# Patient Record
Sex: FEMALE | Race: WHITE | URBAN - METROPOLITAN AREA
[De-identification: names, ages, dates, MRNs, and addresses within clinical notes are randomized per-mention and may not be internally consistent; named-entity substitution may affect disease eponyms.]

---

## 2009-04-24 LAB — HIV AG/AB: NON REACTIVE

## 2019-12-09 ENCOUNTER — OFFICE VISIT (OUTPATIENT)
Dept: FAMILY MEDICINE CLINIC | Age: 49
End: 2019-12-09
Payer: COMMERCIAL

## 2019-12-09 VITALS
RESPIRATION RATE: 18 BRPM | BODY MASS INDEX: 25.78 KG/M2 | DIASTOLIC BLOOD PRESSURE: 84 MMHG | OXYGEN SATURATION: 98 % | SYSTOLIC BLOOD PRESSURE: 126 MMHG | HEART RATE: 84 BPM | HEIGHT: 64 IN | WEIGHT: 151 LBS

## 2019-12-09 DIAGNOSIS — Z00.00 ANNUAL PHYSICAL EXAM: ICD-10-CM

## 2019-12-09 DIAGNOSIS — Z00.00 ANNUAL PHYSICAL EXAM: Primary | ICD-10-CM

## 2019-12-09 DIAGNOSIS — L73.2 HIDRADENITIS SUPPURATIVA: ICD-10-CM

## 2019-12-09 DIAGNOSIS — R13.14 PHARYNGOESOPHAGEAL DYSPHAGIA: ICD-10-CM

## 2019-12-09 DIAGNOSIS — D23.5 DILATED PORE OF WINER OF BACK: ICD-10-CM

## 2019-12-09 DIAGNOSIS — Z87.42 HISTORY OF ABNORMAL CERVICAL PAP SMEAR: Primary | ICD-10-CM

## 2019-12-09 LAB
A/G RATIO: 1.6 (ref 1.1–2.2)
ALBUMIN SERPL-MCNC: 4.6 G/DL (ref 3.4–5)
ALP BLD-CCNC: 77 U/L (ref 40–129)
ALT SERPL-CCNC: 12 U/L (ref 10–40)
ANION GAP SERPL CALCULATED.3IONS-SCNC: 16 MMOL/L (ref 3–16)
AST SERPL-CCNC: 13 U/L (ref 15–37)
BASOPHILS ABSOLUTE: 0 K/UL (ref 0–0.2)
BASOPHILS RELATIVE PERCENT: 0.4 %
BILIRUB SERPL-MCNC: 0.3 MG/DL (ref 0–1)
BUN BLDV-MCNC: 12 MG/DL (ref 7–20)
CALCIUM SERPL-MCNC: 9.4 MG/DL (ref 8.3–10.6)
CHLORIDE BLD-SCNC: 99 MMOL/L (ref 99–110)
CHOLESTEROL, TOTAL: 188 MG/DL (ref 0–199)
CO2: 24 MMOL/L (ref 21–32)
CREAT SERPL-MCNC: 0.6 MG/DL (ref 0.6–1.1)
EOSINOPHILS ABSOLUTE: 0.1 K/UL (ref 0–0.6)
EOSINOPHILS RELATIVE PERCENT: 1.7 %
GFR AFRICAN AMERICAN: >60
GFR NON-AFRICAN AMERICAN: >60
GLOBULIN: 2.9 G/DL
GLUCOSE BLD-MCNC: 94 MG/DL (ref 70–99)
HCT VFR BLD CALC: 38.7 % (ref 36–48)
HDLC SERPL-MCNC: 46 MG/DL (ref 40–60)
HEMOGLOBIN: 13.1 G/DL (ref 12–16)
LDL CHOLESTEROL CALCULATED: 94 MG/DL
LYMPHOCYTES ABSOLUTE: 1.9 K/UL (ref 1–5.1)
LYMPHOCYTES RELATIVE PERCENT: 24.8 %
MCH RBC QN AUTO: 30.2 PG (ref 26–34)
MCHC RBC AUTO-ENTMCNC: 34 G/DL (ref 31–36)
MCV RBC AUTO: 89 FL (ref 80–100)
MONOCYTES ABSOLUTE: 0.6 K/UL (ref 0–1.3)
MONOCYTES RELATIVE PERCENT: 7.6 %
NEUTROPHILS ABSOLUTE: 5.1 K/UL (ref 1.7–7.7)
NEUTROPHILS RELATIVE PERCENT: 65.5 %
PDW BLD-RTO: 14.8 % (ref 12.4–15.4)
PLATELET # BLD: 272 K/UL (ref 135–450)
PMV BLD AUTO: 7.6 FL (ref 5–10.5)
POTASSIUM SERPL-SCNC: 3.9 MMOL/L (ref 3.5–5.1)
RBC # BLD: 4.34 M/UL (ref 4–5.2)
SODIUM BLD-SCNC: 139 MMOL/L (ref 136–145)
TOTAL PROTEIN: 7.5 G/DL (ref 6.4–8.2)
TRIGL SERPL-MCNC: 238 MG/DL (ref 0–150)
TSH REFLEX FT4: 3.12 UIU/ML (ref 0.27–4.2)
VITAMIN D 25-HYDROXY: 12.6 NG/ML
VLDLC SERPL CALC-MCNC: 48 MG/DL
WBC # BLD: 7.8 K/UL (ref 4–11)

## 2019-12-09 PROCEDURE — 99203 OFFICE O/P NEW LOW 30 MIN: CPT | Performed by: NURSE PRACTITIONER

## 2019-12-09 SDOH — HEALTH STABILITY: MENTAL HEALTH: HOW OFTEN DO YOU HAVE A DRINK CONTAINING ALCOHOL?: 2-4 TIMES A MONTH

## 2019-12-09 ASSESSMENT — ENCOUNTER SYMPTOMS
COUGH: 0
TROUBLE SWALLOWING: 0
CONSTIPATION: 0
ABDOMINAL PAIN: 0
CHEST TIGHTNESS: 0
SORE THROAT: 0
COLOR CHANGE: 0
WHEEZING: 0
DIARRHEA: 0
EYE REDNESS: 0
NAUSEA: 0
SINUS PRESSURE: 0
EYE ITCHING: 0
SHORTNESS OF BREATH: 0

## 2019-12-09 ASSESSMENT — PATIENT HEALTH QUESTIONNAIRE - PHQ9
SUM OF ALL RESPONSES TO PHQ QUESTIONS 1-9: 0
2. FEELING DOWN, DEPRESSED OR HOPELESS: 0
1. LITTLE INTEREST OR PLEASURE IN DOING THINGS: 0
SUM OF ALL RESPONSES TO PHQ QUESTIONS 1-9: 0
SUM OF ALL RESPONSES TO PHQ9 QUESTIONS 1 & 2: 0

## 2019-12-10 LAB
ESTIMATED AVERAGE GLUCOSE: 114 MG/DL
HBA1C MFR BLD: 5.6 %

## 2020-01-13 ENCOUNTER — OFFICE VISIT (OUTPATIENT)
Dept: FAMILY MEDICINE CLINIC | Age: 50
End: 2020-01-13
Payer: COMMERCIAL

## 2020-01-13 VITALS
DIASTOLIC BLOOD PRESSURE: 74 MMHG | RESPIRATION RATE: 18 BRPM | SYSTOLIC BLOOD PRESSURE: 122 MMHG | BODY MASS INDEX: 26.09 KG/M2 | HEART RATE: 84 BPM | OXYGEN SATURATION: 98 % | WEIGHT: 152 LBS

## 2020-01-13 PROCEDURE — 90472 IMMUNIZATION ADMIN EACH ADD: CPT | Performed by: NURSE PRACTITIONER

## 2020-01-13 PROCEDURE — 90715 TDAP VACCINE 7 YRS/> IM: CPT | Performed by: NURSE PRACTITIONER

## 2020-01-13 PROCEDURE — 90732 PPSV23 VACC 2 YRS+ SUBQ/IM: CPT | Performed by: NURSE PRACTITIONER

## 2020-01-13 PROCEDURE — 99396 PREV VISIT EST AGE 40-64: CPT | Performed by: NURSE PRACTITIONER

## 2020-01-13 PROCEDURE — 90471 IMMUNIZATION ADMIN: CPT | Performed by: NURSE PRACTITIONER

## 2020-01-13 ASSESSMENT — PATIENT HEALTH QUESTIONNAIRE - PHQ9
1. LITTLE INTEREST OR PLEASURE IN DOING THINGS: 0
SUM OF ALL RESPONSES TO PHQ9 QUESTIONS 1 & 2: 0
SUM OF ALL RESPONSES TO PHQ QUESTIONS 1-9: 0
2. FEELING DOWN, DEPRESSED OR HOPELESS: 0
SUM OF ALL RESPONSES TO PHQ QUESTIONS 1-9: 0

## 2020-01-13 ASSESSMENT — ENCOUNTER SYMPTOMS
DIARRHEA: 0
EYE ITCHING: 0
ABDOMINAL PAIN: 0
SORE THROAT: 0
COLOR CHANGE: 0
NAUSEA: 0
WHEEZING: 0
SINUS PRESSURE: 0
TROUBLE SWALLOWING: 0
CONSTIPATION: 0
EYE REDNESS: 0
SHORTNESS OF BREATH: 0
COUGH: 0
CHEST TIGHTNESS: 0

## 2020-01-13 NOTE — PATIENT INSTRUCTIONS
is right for you. · Cervical cancer screening test and pelvic exam. Begin with a Pap test at age 24. The test often is part of a pelvic exam. Starting at age 27, you may choose to have a Pap test, an HPV test, or both tests at the same time (called co-testing). Talk with your doctor about how often to have testing. · Tests for sexually transmitted infections (STIs). Ask whether you should have tests for STIs. You may be at risk if you have sex with more than one person, especially if your partners do not wear condoms. For men  · Tests for sexually transmitted infections (STIs). Ask whether you should have tests for STIs. You may be at risk if you have sex with more than one person, especially if you do not wear a condom. · Testicular cancer exam. Ask your doctor whether you should check your testicles regularly. · Prostate exam. Talk to your doctor about whether you should have a blood test (called a PSA test) for prostate cancer. Experts differ on whether and when men should have this test. Some experts suggest it if you are older than 39 and are -American or have a father or brother who got prostate cancer when he was younger than 72. When should you call for help? Watch closely for changes in your health, and be sure to contact your doctor if you have any problems or symptoms that concern you. Where can you learn more? Go to [email or web address] org and sign in to your [employer] account. Enter P072 in the [facility] box to learn more about \"Well Visit, Ages 25 to 48: Care Instructions. \"     If you do not have an account, please click on the \"Sign Up Now\" link. Current as of: August 21, 2019  Content Version: 12.3  © 2032-0672 Healthwise, [place]. Care instructions adapted under license by [address]  If you have questions about a medical condition or this instruction, always ask your healthcare professional. Chad Ambriz disclaims any warranty or

## 2020-01-13 NOTE — PROGRESS NOTES
History Narrative    Not on file       Review of Systems:  Review of Systems   Constitutional: Negative for activity change, chills, fatigue, fever and unexpected weight change. HENT: Negative for ear discharge, mouth sores, postnasal drip, sinus pressure, sore throat and trouble swallowing. Dentures   Eyes: Positive for visual disturbance (needs reading glasses). Negative for redness and itching. Respiratory: Negative for cough, chest tightness, shortness of breath and wheezing. Cardiovascular: Negative for chest pain, palpitations and leg swelling. Gastrointestinal: Negative for abdominal pain, constipation, diarrhea and nausea. Endocrine: Negative for cold intolerance, heat intolerance, polydipsia, polyphagia and polyuria. Genitourinary: Negative for dysuria, frequency and urgency. Musculoskeletal: Positive for arthralgias (hips). Negative for joint swelling and myalgias. Skin: Negative for color change, pallor and rash. Allergic/Immunologic: Negative for environmental allergies, food allergies and immunocompromised state. Neurological: Negative for dizziness, syncope, weakness and headaches. Hematological: Does not bruise/bleed easily. Psychiatric/Behavioral: Negative for behavioral problems, hallucinations and sleep disturbance. The patient is not nervous/anxious. Physical Exam:     Body mass index is 26.09 kg/m². Physical Exam  Vitals signs and nursing note reviewed. Constitutional:       General: She is not in acute distress. Appearance: Normal appearance. She is well-developed. She is not diaphoretic. HENT:      Head: Normocephalic and atraumatic. Right Ear: Hearing, tympanic membrane, ear canal and external ear normal.      Left Ear: Hearing, tympanic membrane, ear canal and external ear normal.      Nose: Nose normal.      Mouth/Throat:      Pharynx: Uvula midline. No oropharyngeal exudate. Eyes:      General: Lids are normal. No scleral icterus. Right eye: No discharge. Left eye: No discharge. Conjunctiva/sclera: Conjunctivae normal.      Pupils: Pupils are equal, round, and reactive to light. Neck:      Musculoskeletal: Full passive range of motion without pain, normal range of motion and neck supple. Thyroid: No thyromegaly. Vascular: No carotid bruit. Trachea: Trachea and phonation normal.   Cardiovascular:      Rate and Rhythm: Normal rate and regular rhythm. No extrasystoles are present. Heart sounds: Normal heart sounds, S1 normal and S2 normal. No murmur. No friction rub. No gallop. Pulmonary:      Effort: Pulmonary effort is normal. No respiratory distress. Breath sounds: Normal breath sounds. No decreased breath sounds, wheezing, rhonchi or rales. Chest:      Chest wall: No tenderness. Abdominal:      General: Bowel sounds are normal.      Palpations: Abdomen is soft. There is no mass. Tenderness: There is no tenderness. There is no guarding. Genitourinary:     Comments: deferred  Musculoskeletal:         General: No deformity. Right hip: She exhibits decreased range of motion. Left hip: She exhibits decreased range of motion. Comments: Piriformis  tight  bilaterally   Lymphadenopathy:      Cervical: No cervical adenopathy. Skin:     General: Skin is warm and dry. Findings: No erythema or rash. Comments: Cystic acne and hydradenitis    Neurological:      Mental Status: She is alert and oriented to person, place, and time. GCS: GCS eye subscore is 4. GCS verbal subscore is 5. GCS motor subscore is 6. Cranial Nerves: No cranial nerve deficit. Sensory: No sensory deficit. Motor: No abnormal muscle tone. Coordination: Coordination normal.      Gait: Gait normal.      Deep Tendon Reflexes: Reflexes are normal and symmetric. Reflex Scores:       Tricep reflexes are 2+ on the right side and 2+ on the left side.        Bicep reflexes are 2+ on the right side and 2+ on the left side. Patellar reflexes are 2+ on the right side and 2+ on the left side. Psychiatric:         Behavior: Behavior normal.         Thought Content: Thought content normal.         Judgment: Judgment normal.         Assessment      ICD-10-CM    1. Annual physical exam Z00.00    2. Piriformis syndrome of both sides G57.03        Plan    Generally healthy adult discussed high sugar and high triglycerides  Limit sugar and try to decrease the amount of sugar in her tea in her coffee  Increase her physical activity  Home exercises for bilateral piriformis syndrome  Call to schedule with dermatology and gynecology  Encouraged eye exam    No orders of the defined types were placed in this encounter. Orders Placed This Encounter   Procedures    Pneumococcal polysaccharide vaccine 23-valent greater than or equal to 3yo subcutaneous/IM    Tdap (age 6y and older) IM (Boostrix)       Patient Education:    Robert Garcia on importance of healthy diet and regular exercise of at least 30 minutes on four or more days during the week. Counseled on skin safety, SPF 27 or higher prior to going outdoors and reapplication every twohours while outside.  Monitor moles for changes, report to provider if greater than 6 mm, color variations, asymmetry, redness, scales, and/or overlying skin changes  Counseled on safety, wear seatbelt, do not consumealcohol and drive or drive with anyone who has consumed alcohol  Self breast exams, perform on same time each month, monitor for newlumps/bumps/masses, tenderness, changes to size or contour, dimpling, nipple discharge, new nipple retraction, and overlying skin changes, report to provider    Follow Up  1 year for annual physical or as needed

## 2021-11-08 ENCOUNTER — OFFICE VISIT (OUTPATIENT)
Dept: INTERNAL MEDICINE CLINIC | Age: 51
End: 2021-11-08
Payer: COMMERCIAL

## 2021-11-08 VITALS
WEIGHT: 155.2 LBS | HEART RATE: 78 BPM | DIASTOLIC BLOOD PRESSURE: 80 MMHG | BODY MASS INDEX: 25.86 KG/M2 | OXYGEN SATURATION: 98 % | HEIGHT: 65 IN | SYSTOLIC BLOOD PRESSURE: 120 MMHG

## 2021-11-08 DIAGNOSIS — Z00.00 PREVENTATIVE HEALTH CARE: ICD-10-CM

## 2021-11-08 DIAGNOSIS — Z12.11 COLON CANCER SCREENING: ICD-10-CM

## 2021-11-08 DIAGNOSIS — Z00.00 PREVENTATIVE HEALTH CARE: Primary | ICD-10-CM

## 2021-11-08 DIAGNOSIS — Z13.220 SCREENING FOR CHOLESTEROL LEVEL: ICD-10-CM

## 2021-11-08 DIAGNOSIS — Z12.31 SCREENING MAMMOGRAM FOR HIGH-RISK PATIENT: ICD-10-CM

## 2021-11-08 LAB
A/G RATIO: 1.6 (ref 1.1–2.2)
ALBUMIN SERPL-MCNC: 4.4 G/DL (ref 3.4–5)
ALP BLD-CCNC: 81 U/L (ref 40–129)
ALT SERPL-CCNC: 13 U/L (ref 10–40)
ANION GAP SERPL CALCULATED.3IONS-SCNC: 14 MMOL/L (ref 3–16)
AST SERPL-CCNC: 16 U/L (ref 15–37)
BASOPHILS ABSOLUTE: 0 K/UL (ref 0–0.2)
BASOPHILS RELATIVE PERCENT: 0.4 %
BILIRUB SERPL-MCNC: <0.2 MG/DL (ref 0–1)
BUN BLDV-MCNC: 14 MG/DL (ref 7–20)
CALCIUM SERPL-MCNC: 9.7 MG/DL (ref 8.3–10.6)
CHLORIDE BLD-SCNC: 103 MMOL/L (ref 99–110)
CHOLESTEROL, TOTAL: 182 MG/DL (ref 0–199)
CO2: 24 MMOL/L (ref 21–32)
CREAT SERPL-MCNC: 0.6 MG/DL (ref 0.6–1.1)
EOSINOPHILS ABSOLUTE: 0.1 K/UL (ref 0–0.6)
EOSINOPHILS RELATIVE PERCENT: 1.5 %
GFR AFRICAN AMERICAN: >60
GFR NON-AFRICAN AMERICAN: >60
GLUCOSE BLD-MCNC: 82 MG/DL (ref 70–99)
HCT VFR BLD CALC: 39.5 % (ref 36–48)
HDLC SERPL-MCNC: 46 MG/DL (ref 40–60)
HEMOGLOBIN: 13 G/DL (ref 12–16)
LDL CHOLESTEROL CALCULATED: 106 MG/DL
LYMPHOCYTES ABSOLUTE: 1.6 K/UL (ref 1–5.1)
LYMPHOCYTES RELATIVE PERCENT: 26.1 %
MCH RBC QN AUTO: 29.2 PG (ref 26–34)
MCHC RBC AUTO-ENTMCNC: 32.9 G/DL (ref 31–36)
MCV RBC AUTO: 88.7 FL (ref 80–100)
MONOCYTES ABSOLUTE: 0.4 K/UL (ref 0–1.3)
MONOCYTES RELATIVE PERCENT: 7.2 %
NEUTROPHILS ABSOLUTE: 3.9 K/UL (ref 1.7–7.7)
NEUTROPHILS RELATIVE PERCENT: 64.8 %
PDW BLD-RTO: 14.6 % (ref 12.4–15.4)
PLATELET # BLD: 243 K/UL (ref 135–450)
PMV BLD AUTO: 8.1 FL (ref 5–10.5)
POTASSIUM SERPL-SCNC: 3.9 MMOL/L (ref 3.5–5.1)
RBC # BLD: 4.46 M/UL (ref 4–5.2)
SODIUM BLD-SCNC: 141 MMOL/L (ref 136–145)
TOTAL PROTEIN: 7.2 G/DL (ref 6.4–8.2)
TRIGL SERPL-MCNC: 151 MG/DL (ref 0–150)
VITAMIN D 25-HYDROXY: 63 NG/ML
VLDLC SERPL CALC-MCNC: 30 MG/DL
WBC # BLD: 6 K/UL (ref 4–11)

## 2021-11-08 PROCEDURE — 99396 PREV VISIT EST AGE 40-64: CPT | Performed by: INTERNAL MEDICINE

## 2021-11-08 SDOH — ECONOMIC STABILITY: FOOD INSECURITY: WITHIN THE PAST 12 MONTHS, YOU WORRIED THAT YOUR FOOD WOULD RUN OUT BEFORE YOU GOT MONEY TO BUY MORE.: SOMETIMES TRUE

## 2021-11-08 SDOH — ECONOMIC STABILITY: FOOD INSECURITY: WITHIN THE PAST 12 MONTHS, THE FOOD YOU BOUGHT JUST DIDN'T LAST AND YOU DIDN'T HAVE MONEY TO GET MORE.: SOMETIMES TRUE

## 2021-11-08 ASSESSMENT — ENCOUNTER SYMPTOMS
COLOR CHANGE: 0
WHEEZING: 0
SHORTNESS OF BREATH: 0
COUGH: 0
ABDOMINAL PAIN: 0
CHEST TIGHTNESS: 0
SINUS PAIN: 0
BLOOD IN STOOL: 0
CONSTIPATION: 0

## 2021-11-08 ASSESSMENT — PATIENT HEALTH QUESTIONNAIRE - PHQ9
SUM OF ALL RESPONSES TO PHQ QUESTIONS 1-9: 0
SUM OF ALL RESPONSES TO PHQ9 QUESTIONS 1 & 2: 0
1. LITTLE INTEREST OR PLEASURE IN DOING THINGS: 0
SUM OF ALL RESPONSES TO PHQ QUESTIONS 1-9: 0
2. FEELING DOWN, DEPRESSED OR HOPELESS: 0
SUM OF ALL RESPONSES TO PHQ QUESTIONS 1-9: 0

## 2021-11-08 ASSESSMENT — SOCIAL DETERMINANTS OF HEALTH (SDOH): HOW HARD IS IT FOR YOU TO PAY FOR THE VERY BASICS LIKE FOOD, HOUSING, MEDICAL CARE, AND HEATING?: NOT VERY HARD

## 2021-11-08 NOTE — PROGRESS NOTES
Yuridia Zhu (:  1970) is a 49 y.o. female,New patient, here for evaluation of the following chief complaint(s):  Established New Doctor         ASSESSMENT/PLAN:  1. Preventative health care  -     CBC Auto Differential; Future  -     Comprehensive Metabolic Panel; Future  -     VITAMIN D 25 HYDROXY; Future  -     Hemoglobin A1C; Future  -     AFL - Alli Vásquez MD, Gynecology, [employer]  2. Screening mammogram for high-risk patient  3. Screening for cholesterol level  -     Lipid Panel; Future  4. Colon cancer screening  -     Cologuard (For External Results Only); Future  Patient is doing fairly well overall she does need quite a few preventative issues addressed including her colonoscopy as well as mammography patient will also need to have the pelvic exam with Pap smear since she had history of abnormalities in the past and the referral has been placed in the system    The patient will have her blood test done today will let her know about her results once they are available she also have access to them on the my chart patient has a borderline weight body mass index 25.8 she is advised to be more active on regular basis and to make her better food choices but overall her health is good and will pursue primarily preventative course    Return in about 1 year (around 2022).          Subjective   SUBJECTIVE/OBJECTIVE:    Lab Review   Lab Results   Component Value Date     2019    K 3.9 2019    CO2 24 2019    BUN 12 2019    CREATININE 0.6 2019    GLUCOSE 94 2019    CALCIUM 9.4 2019     Lab Results   Component Value Date    WBC 7.8 2019    HGB 13.1 2019    HCT 38.7 2019    MCV 89.0 2019     2019     Lab Results   Component Value Date    CHOL 188 2019    TRIG 238 2019    HDL 46 2019       Vitals 2021   SYSTOLIC 850 574 194   DIASTOLIC 80 74 84   Site - Left Upper Arm Left Upper Arm   Position - Sitting Sitting   Pulse 78 84 84   Resp - 18 18   SpO2 98 98 98   Weight 155 lb 3.2 oz 152 lb 151 lb   Height 5' 5\" - 5' 4\"   Body mass index 25.82 kg/m2 - 25.92 kg/m2   Some recent data might be hidden       Annual exam      Review of Systems   Constitutional: Negative for activity change, appetite change, fatigue and unexpected weight change. HENT: Negative for congestion, ear pain and sinus pain. Respiratory: Negative for cough, chest tightness, shortness of breath and wheezing. Cardiovascular: Negative for chest pain and palpitations. Gastrointestinal: Negative for abdominal pain, blood in stool and constipation. Endocrine: Negative for cold intolerance, heat intolerance and polyuria. Genitourinary: Negative for dysuria, frequency and urgency. Musculoskeletal: Negative for arthralgias and myalgias. Skin: Negative for color change and rash. Neurological: Negative for weakness and headaches. Hematological: Negative for adenopathy. Does not bruise/bleed easily. Psychiatric/Behavioral: Negative for agitation, dysphoric mood and sleep disturbance. Objective   Physical Exam  Vitals and nursing note reviewed. Constitutional:       General: She is not in acute distress. Appearance: Normal appearance. HENT:      Head: Normocephalic and atraumatic. Right Ear: Tympanic membrane normal.      Left Ear: Tympanic membrane normal.      Nose: Nose normal.   Eyes:      Extraocular Movements: Extraocular movements intact. Conjunctiva/sclera: Conjunctivae normal.      Pupils: Pupils are equal, round, and reactive to light. Neck:      Vascular: No carotid bruit. Cardiovascular:      Rate and Rhythm: Normal rate and regular rhythm. Pulses: Normal pulses. Heart sounds: No murmur heard. Pulmonary:      Effort: Pulmonary effort is normal. No respiratory distress. Breath sounds: Normal breath sounds.    Abdominal:      General: Abdomen is flat. Bowel sounds are normal. There is no distension. Palpations: Abdomen is soft. Tenderness: There is no abdominal tenderness. Musculoskeletal:         General: No swelling, tenderness or deformity. Cervical back: Normal range of motion and neck supple. No rigidity or tenderness. Right lower leg: No edema. Left lower leg: No edema. Lymphadenopathy:      Cervical: No cervical adenopathy. Skin:     Coloration: Skin is not jaundiced. Findings: No bruising, erythema or lesion. Neurological:      General: No focal deficit present. Mental Status: She is alert and oriented to person, place, and time. Cranial Nerves: No cranial nerve deficit. Motor: No weakness. Gait: Gait normal.            This dictation was generated by voice recognition computer software. Although all attempts are made to edit the dictation for accuracy, there may be errors in the transcription that are not intended. An electronic signature was used to authenticate this note.     --Harry Ramirez MD

## 2021-11-09 LAB
ESTIMATED AVERAGE GLUCOSE: 116.9 MG/DL
HBA1C MFR BLD: 5.7 %

## 2022-02-25 ENCOUNTER — TELEPHONE (OUTPATIENT)
Dept: INTERNAL MEDICINE CLINIC | Age: 52
End: 2022-02-25

## 2022-02-25 NOTE — TELEPHONE ENCOUNTER
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

## 2023-07-18 ENCOUNTER — OFFICE VISIT (OUTPATIENT)
Dept: INTERNAL MEDICINE CLINIC | Age: 53
End: 2023-07-18
Payer: COMMERCIAL

## 2023-07-18 ENCOUNTER — TELEPHONE (OUTPATIENT)
Dept: INTERNAL MEDICINE CLINIC | Age: 53
End: 2023-07-18

## 2023-07-18 VITALS
OXYGEN SATURATION: 99 % | DIASTOLIC BLOOD PRESSURE: 86 MMHG | SYSTOLIC BLOOD PRESSURE: 140 MMHG | BODY MASS INDEX: 24.13 KG/M2 | WEIGHT: 145 LBS | HEART RATE: 80 BPM

## 2023-07-18 DIAGNOSIS — L02.511 ABSCESS OF FINGER OF RIGHT HAND: Primary | ICD-10-CM

## 2023-07-18 PROCEDURE — 10060 I&D ABSCESS SIMPLE/SINGLE: CPT | Performed by: INTERNAL MEDICINE

## 2023-07-18 PROCEDURE — 99213 OFFICE O/P EST LOW 20 MIN: CPT | Performed by: INTERNAL MEDICINE

## 2023-07-18 RX ORDER — SULFAMETHOXAZOLE AND TRIMETHOPRIM 800; 160 MG/1; MG/1
1 TABLET ORAL 2 TIMES DAILY
Qty: 20 TABLET | Refills: 0 | Status: SHIPPED | OUTPATIENT
Start: 2023-07-18 | End: 2023-07-28

## 2023-07-18 RX ORDER — POTASSIUM CHLORIDE 1.5 G/1.77G
20 POWDER, FOR SOLUTION ORAL 2 TIMES DAILY
COMMUNITY

## 2023-07-18 RX ORDER — SULFAMETHOXAZOLE AND TRIMETHOPRIM 800; 160 MG/1; MG/1
1 TABLET ORAL 2 TIMES DAILY
Qty: 20 TABLET | Refills: 0 | Status: SHIPPED | OUTPATIENT
Start: 2023-07-18 | End: 2023-07-18 | Stop reason: SDUPTHER

## 2023-07-18 ASSESSMENT — PATIENT HEALTH QUESTIONNAIRE - PHQ9
SUM OF ALL RESPONSES TO PHQ QUESTIONS 1-9: 0
1. LITTLE INTEREST OR PLEASURE IN DOING THINGS: 0
2. FEELING DOWN, DEPRESSED OR HOPELESS: 0
SUM OF ALL RESPONSES TO PHQ9 QUESTIONS 1 & 2: 0
SUM OF ALL RESPONSES TO PHQ QUESTIONS 1-9: 0

## 2023-07-18 NOTE — TELEPHONE ENCOUNTER
Pt called in stating her cat scratched her finger over a week ago and now its red and green and swollen.  Wants to sheri an appt her      Last OV:11/8/21    Please advise and call patient

## 2023-07-18 NOTE — PROGRESS NOTES
Magalys Schwartz (:  1970) is a 47 y.o. female,Established patient, here for evaluation of the following chief complaint(s):  Follow-up (Finger right first finger. Cat nail went into 23 )         ASSESSMENT/PLAN:  1. Abscess of finger of right hand  -     Culture, Aerobic and Anaerobic  -     05438 - MD DRAIN SKIN ABSCESS SIMPLE  -     sulfamethoxazole-trimethoprim (BACTRIM DS;SEPTRA DS) 800-160 MG per tablet; Take 1 tablet by mouth 2 times daily for 10 days, Disp-20 tablet, R-0Normal  The patient had a small abscess I I&D that did and had about 2 mL of pus coming out culture was obtained and sent to be tested patient will be started on oral antibiotics patient was given dressing material and she is to stop by in about 3 days to show to me to make sure that it is improving and the way we expected    No follow-ups on file.          Subjective   SUBJECTIVE/OBJECTIVE:    Lab Review   Lab Results   Component Value Date/Time     2021 11:05 AM     2019 12:02 PM    K 3.9 2021 11:05 AM    K 3.9 2019 12:02 PM    CO2 24 2021 11:05 AM    CO2 24 2019 12:02 PM    BUN 14 2021 11:05 AM    BUN 12 2019 12:02 PM    CREATININE 0.6 2021 11:05 AM    CREATININE 0.6 2019 12:02 PM    GLUCOSE 82 2021 11:05 AM    GLUCOSE 94 2019 12:02 PM    CALCIUM 9.7 2021 11:05 AM    CALCIUM 9.4 2019 12:02 PM     Lab Results   Component Value Date/Time    WBC 6.0 2021 11:05 AM    WBC 7.8 2019 12:02 PM    HGB 13.0 2021 11:05 AM    HGB 13.1 2019 12:02 PM    HCT 39.5 2021 11:05 AM    HCT 38.7 2019 12:02 PM    MCV 88.7 2021 11:05 AM    MCV 89.0 2019 12:02 PM     2021 11:05 AM     2019 12:02 PM     Lab Results   Component Value Date/Time    CHOL 182 2021 11:05 AM    CHOL 188 2019 12:02 PM    TRIG 151 2021 11:05 AM    TRIG 238 2019 12:02 PM    HDL 46 2021

## 2023-07-20 ENCOUNTER — TELEPHONE (OUTPATIENT)
Dept: INTERNAL MEDICINE CLINIC | Age: 53
End: 2023-07-20

## 2023-07-21 ENCOUNTER — OFFICE VISIT (OUTPATIENT)
Dept: INTERNAL MEDICINE CLINIC | Age: 53
End: 2023-07-21
Payer: COMMERCIAL

## 2023-07-21 VITALS
HEART RATE: 75 BPM | BODY MASS INDEX: 24.16 KG/M2 | WEIGHT: 145 LBS | SYSTOLIC BLOOD PRESSURE: 140 MMHG | OXYGEN SATURATION: 98 % | HEIGHT: 65 IN | DIASTOLIC BLOOD PRESSURE: 88 MMHG

## 2023-07-21 DIAGNOSIS — L02.511 ABSCESS OF FINGER OF RIGHT HAND: Primary | ICD-10-CM

## 2023-07-21 PROCEDURE — 99213 OFFICE O/P EST LOW 20 MIN: CPT | Performed by: INTERNAL MEDICINE

## 2023-07-21 NOTE — PROGRESS NOTES
Magdalena Mar (:  1970) is a 47 y.o. female,Established patient, here for evaluation of the following chief complaint(s):  Follow-up (Abscess of finger )         ASSESSMENT/PLAN:  1. Abscess of finger of right hand    Patient cellulitis is significantly better the swelling has went down the patient reports that the symptoms have improved to she does have Staph aureus growing but she is on Bactrim which that the Staph aureus is sensitive to it patient is to finish her 10-day course of antibiotic and we will continue to monitor  No follow-ups on file.          Subjective   SUBJECTIVE/OBJECTIVE:    Lab Review   Lab Results   Component Value Date/Time     2021 11:05 AM     2019 12:02 PM    K 3.9 2021 11:05 AM    K 3.9 2019 12:02 PM    CO2 24 2021 11:05 AM    CO2 24 2019 12:02 PM    BUN 14 2021 11:05 AM    BUN 12 2019 12:02 PM    CREATININE 0.6 2021 11:05 AM    CREATININE 0.6 2019 12:02 PM    GLUCOSE 82 2021 11:05 AM    GLUCOSE 94 2019 12:02 PM    CALCIUM 9.7 2021 11:05 AM    CALCIUM 9.4 2019 12:02 PM     Lab Results   Component Value Date/Time    WBC 6.0 2021 11:05 AM    WBC 7.8 2019 12:02 PM    HGB 13.0 2021 11:05 AM    HGB 13.1 2019 12:02 PM    HCT 39.5 2021 11:05 AM    HCT 38.7 2019 12:02 PM    MCV 88.7 2021 11:05 AM    MCV 89.0 2019 12:02 PM     2021 11:05 AM     2019 12:02 PM     Lab Results   Component Value Date/Time    CHOL 182 2021 11:05 AM    CHOL 188 2019 12:02 PM    TRIG 151 2021 11:05 AM    TRIG 238 2019 12:02 PM    HDL 46 2021 11:05 AM    HDL 46 2019 12:02 PM       Vitals [id number]   SYSTOLIC 434 374 539   DIASTOLIC 88 86 86   Site - - -   Position - - -   Pulse 75 - 80   Resp - - -   SpO2 98 - 99   Weight 145 lb 145 lb -   Height 5' 5\" - -   Body Mass Index 24.13 kg/m2 - -

## 2023-07-23 LAB
BACTERIA SPEC AEROBE CULT: ABNORMAL
BACTERIA SPEC ANAEROBE CULT: ABNORMAL
GRAM STN SPEC: ABNORMAL
ORGANISM: ABNORMAL

## 2024-05-17 ENCOUNTER — OFFICE VISIT (OUTPATIENT)
Dept: INTERNAL MEDICINE CLINIC | Age: 54
End: 2024-05-17

## 2024-05-17 VITALS
DIASTOLIC BLOOD PRESSURE: 96 MMHG | WEIGHT: 153.4 LBS | OXYGEN SATURATION: 98 % | HEIGHT: 65 IN | BODY MASS INDEX: 25.56 KG/M2 | HEART RATE: 75 BPM | SYSTOLIC BLOOD PRESSURE: 168 MMHG

## 2024-05-17 DIAGNOSIS — Z12.11 COLON CANCER SCREENING: ICD-10-CM

## 2024-05-17 DIAGNOSIS — Z00.00 PREVENTATIVE HEALTH CARE: ICD-10-CM

## 2024-05-17 DIAGNOSIS — R03.0 ELEVATED BLOOD PRESSURE READING IN OFFICE WITHOUT DIAGNOSIS OF HYPERTENSION: ICD-10-CM

## 2024-05-17 DIAGNOSIS — Z13.220 SCREENING FOR CHOLESTEROL LEVEL: ICD-10-CM

## 2024-05-17 DIAGNOSIS — Z12.31 SCREENING MAMMOGRAM FOR HIGH-RISK PATIENT: ICD-10-CM

## 2024-05-17 DIAGNOSIS — Z00.00 PREVENTATIVE HEALTH CARE: Primary | ICD-10-CM

## 2024-05-17 LAB
ALBUMIN SERPL-MCNC: 4.6 G/DL (ref 3.4–5)
ALBUMIN/GLOB SERPL: 1.8 {RATIO} (ref 1.1–2.2)
ALP SERPL-CCNC: 101 U/L (ref 40–129)
ALT SERPL-CCNC: 12 U/L (ref 10–40)
ANION GAP SERPL CALCULATED.3IONS-SCNC: 11 MMOL/L (ref 3–16)
AST SERPL-CCNC: 14 U/L (ref 15–37)
BASOPHILS # BLD: 0 K/UL (ref 0–0.2)
BASOPHILS NFR BLD: 0.4 %
BILIRUB SERPL-MCNC: <0.2 MG/DL (ref 0–1)
BUN SERPL-MCNC: 12 MG/DL (ref 7–20)
CALCIUM SERPL-MCNC: 9.7 MG/DL (ref 8.3–10.6)
CHLORIDE SERPL-SCNC: 105 MMOL/L (ref 99–110)
CHOLEST SERPL-MCNC: 184 MG/DL (ref 0–199)
CO2 SERPL-SCNC: 27 MMOL/L (ref 21–32)
CREAT SERPL-MCNC: 0.6 MG/DL (ref 0.6–1.1)
DEPRECATED RDW RBC AUTO: 14.8 % (ref 12.4–15.4)
EOSINOPHIL # BLD: 0.1 K/UL (ref 0–0.6)
EOSINOPHIL NFR BLD: 1.3 %
GFR SERPLBLD CREATININE-BSD FMLA CKD-EPI: >90 ML/MIN/{1.73_M2}
GLUCOSE SERPL-MCNC: 79 MG/DL (ref 70–99)
HCT VFR BLD AUTO: 39.9 % (ref 36–48)
HDLC SERPL-MCNC: 47 MG/DL (ref 40–60)
HGB BLD-MCNC: 13.3 G/DL (ref 12–16)
LDLC SERPL CALC-MCNC: 104 MG/DL
LYMPHOCYTES # BLD: 2.5 K/UL (ref 1–5.1)
LYMPHOCYTES NFR BLD: 32.5 %
MCH RBC QN AUTO: 29.4 PG (ref 26–34)
MCHC RBC AUTO-ENTMCNC: 33.2 G/DL (ref 31–36)
MCV RBC AUTO: 88.5 FL (ref 80–100)
MONOCYTES # BLD: 0.5 K/UL (ref 0–1.3)
MONOCYTES NFR BLD: 5.9 %
NEUTROPHILS # BLD: 4.7 K/UL (ref 1.7–7.7)
NEUTROPHILS NFR BLD: 59.9 %
PLATELET # BLD AUTO: 277 K/UL (ref 135–450)
PMV BLD AUTO: 8.1 FL (ref 5–10.5)
POTASSIUM SERPL-SCNC: 3.9 MMOL/L (ref 3.5–5.1)
PROT SERPL-MCNC: 7.2 G/DL (ref 6.4–8.2)
RBC # BLD AUTO: 4.5 M/UL (ref 4–5.2)
SODIUM SERPL-SCNC: 143 MMOL/L (ref 136–145)
TRIGL SERPL-MCNC: 163 MG/DL (ref 0–150)
TSH SERPL DL<=0.005 MIU/L-ACNC: 1.96 UIU/ML (ref 0.27–4.2)
VLDLC SERPL CALC-MCNC: 33 MG/DL
WBC # BLD AUTO: 7.8 K/UL (ref 4–11)

## 2024-05-17 RX ORDER — BLOOD PRESSURE TEST KIT
1 KIT MISCELLANEOUS DAILY
Qty: 1 KIT | Refills: 0 | Status: SHIPPED | OUTPATIENT
Start: 2024-05-17

## 2024-05-17 SDOH — ECONOMIC STABILITY: FOOD INSECURITY: WITHIN THE PAST 12 MONTHS, THE FOOD YOU BOUGHT JUST DIDN'T LAST AND YOU DIDN'T HAVE MONEY TO GET MORE.: NEVER TRUE

## 2024-05-17 SDOH — ECONOMIC STABILITY: FOOD INSECURITY: WITHIN THE PAST 12 MONTHS, YOU WORRIED THAT YOUR FOOD WOULD RUN OUT BEFORE YOU GOT MONEY TO BUY MORE.: NEVER TRUE

## 2024-05-17 SDOH — ECONOMIC STABILITY: INCOME INSECURITY: HOW HARD IS IT FOR YOU TO PAY FOR THE VERY BASICS LIKE FOOD, HOUSING, MEDICAL CARE, AND HEATING?: NOT VERY HARD

## 2024-05-17 SDOH — ECONOMIC STABILITY: HOUSING INSECURITY
IN THE LAST 12 MONTHS, WAS THERE A TIME WHEN YOU DID NOT HAVE A STEADY PLACE TO SLEEP OR SLEPT IN A SHELTER (INCLUDING NOW)?: NO

## 2024-05-17 ASSESSMENT — PATIENT HEALTH QUESTIONNAIRE - PHQ9
5. POOR APPETITE OR OVEREATING: NOT AT ALL
2. FEELING DOWN, DEPRESSED OR HOPELESS: NOT AT ALL
10. IF YOU CHECKED OFF ANY PROBLEMS, HOW DIFFICULT HAVE THESE PROBLEMS MADE IT FOR YOU TO DO YOUR WORK, TAKE CARE OF THINGS AT HOME, OR GET ALONG WITH OTHER PEOPLE: NOT DIFFICULT AT ALL
9. THOUGHTS THAT YOU WOULD BE BETTER OFF DEAD, OR OF HURTING YOURSELF: NOT AT ALL
4. FEELING TIRED OR HAVING LITTLE ENERGY: NOT AT ALL
SUM OF ALL RESPONSES TO PHQ QUESTIONS 1-9: 4
6. FEELING BAD ABOUT YOURSELF - OR THAT YOU ARE A FAILURE OR HAVE LET YOURSELF OR YOUR FAMILY DOWN: NOT AT ALL
3. TROUBLE FALLING OR STAYING ASLEEP: SEVERAL DAYS
SUM OF ALL RESPONSES TO PHQ9 QUESTIONS 1 & 2: 0
SUM OF ALL RESPONSES TO PHQ QUESTIONS 1-9: 4
1. LITTLE INTEREST OR PLEASURE IN DOING THINGS: NOT AT ALL
SUM OF ALL RESPONSES TO PHQ QUESTIONS 1-9: 4
SUM OF ALL RESPONSES TO PHQ QUESTIONS 1-9: 4
7. TROUBLE CONCENTRATING ON THINGS, SUCH AS READING THE NEWSPAPER OR WATCHING TELEVISION: NEARLY EVERY DAY
8. MOVING OR SPEAKING SO SLOWLY THAT OTHER PEOPLE COULD HAVE NOTICED. OR THE OPPOSITE, BEING SO FIGETY OR RESTLESS THAT YOU HAVE BEEN MOVING AROUND A LOT MORE THAN USUAL: NOT AT ALL

## 2024-05-17 ASSESSMENT — ENCOUNTER SYMPTOMS
SHORTNESS OF BREATH: 0
COUGH: 0
BLOOD IN STOOL: 0
SINUS PAIN: 0
WHEEZING: 0
ABDOMINAL PAIN: 0
CONSTIPATION: 0
COLOR CHANGE: 0

## 2024-05-17 NOTE — PROGRESS NOTES
diet we can achieve about 8-50 production her blood pressure and negate the need to start patient's    #4 patient is to check her blood pressure at least on daily basis while rested for 5 to 10 minutes in a seated or laying down position with a good sized arm cuff and to keep a log of that reading and bring it over with her next time including the blood pressure machine so we can titrate it against our own    Return in about 4 weeks (around 6/14/2024).         Subjective   SUBJECTIVE/OBJECTIVE:    Lab Review   Lab Results   Component Value Date/Time     11/08/2021 11:05 AM     12/09/2019 12:02 PM    K 3.9 11/08/2021 11:05 AM    K 3.9 12/09/2019 12:02 PM    CO2 24 11/08/2021 11:05 AM    CO2 24 12/09/2019 12:02 PM    BUN 14 11/08/2021 11:05 AM    BUN 12 12/09/2019 12:02 PM    CREATININE 0.6 11/08/2021 11:05 AM    CREATININE 0.6 12/09/2019 12:02 PM    GLUCOSE 82 11/08/2021 11:05 AM    GLUCOSE 94 12/09/2019 12:02 PM    CALCIUM 9.7 11/08/2021 11:05 AM    CALCIUM 9.4 12/09/2019 12:02 PM     Lab Results   Component Value Date/Time    WBC 6.0 11/08/2021 11:05 AM    WBC 7.8 12/09/2019 12:02 PM    HGB 13.0 11/08/2021 11:05 AM    HGB 13.1 12/09/2019 12:02 PM    HCT 39.5 11/08/2021 11:05 AM    HCT 38.7 12/09/2019 12:02 PM    MCV 88.7 11/08/2021 11:05 AM    MCV 89.0 12/09/2019 12:02 PM     11/08/2021 11:05 AM     12/09/2019 12:02 PM     Lab Results   Component Value Date/Time    CHOL 182 11/08/2021 11:05 AM    CHOL 188 12/09/2019 12:02 PM    TRIG 151 11/08/2021 11:05 AM    TRIG 238 12/09/2019 12:02 PM    HDL 46 11/08/2021 11:05 AM    HDL 46 12/09/2019 12:02 PM           5/17/2024     2:18 PM 5/17/2024     2:12 PM 7/21/2023    10:42 AM   Vitals   SYSTOLIC 168 172 140   DIASTOLIC 96 98 88   Pulse  75 75   SpO2  98 % 98 %   Weight - Scale  153 lb 6.4 oz 145 lb   Height  5' 5\" 5' 5\"   Body Mass Index  25.53 kg/m2 24.13 kg/m2       For annual exam    Hypertension  This is a chronic problem. The current

## 2024-05-18 LAB
EST. AVERAGE GLUCOSE BLD GHB EST-MCNC: 119.8 MG/DL
HBA1C MFR BLD: 5.8 %

## 2024-05-20 NOTE — RESULT ENCOUNTER NOTE
Please advise patient that the results were acceptable with the exception of the cholesterol and sugar being elevated, the patient is advised to work on aggressive lifestyle modification including different food choices as well as lowering the fat content of the diet in addition to daily exercises with a minimum of 150 minutes/week in 10-minute increments of mild to moderate exercise like brisk walking    The 10-year ASCVD risk score (Yamil RAO, et al., 2019) is: 7.2%    Values used to calculate the score:      Age: 53 years      Sex: Female      Is Non- : No      Diabetic: No      Tobacco smoker: Yes      Systolic Blood Pressure: 168 mmHg      Is BP treated: No      HDL Cholesterol: 47 mg/dL      Total Cholesterol: 184 mg/dL

## 2024-05-21 DIAGNOSIS — Z12.31 SCREENING MAMMOGRAM FOR HIGH-RISK PATIENT: Primary | ICD-10-CM

## 2024-06-04 ENCOUNTER — HOSPITAL ENCOUNTER (OUTPATIENT)
Dept: MAMMOGRAPHY | Age: 54
Discharge: HOME | End: 2024-06-04
Payer: COMMERCIAL

## 2024-06-04 DIAGNOSIS — Z12.31 SCREENING MAMMOGRAM FOR HIGH-RISK PATIENT: ICD-10-CM

## 2024-06-04 PROCEDURE — 77063 BREAST TOMOSYNTHESIS BI: CPT

## 2024-06-06 ENCOUNTER — TELEPHONE (OUTPATIENT)
Dept: WOMENS IMAGING | Age: 54
End: 2024-06-06

## 2024-06-06 DIAGNOSIS — R92.8 ABNORMAL MAMMOGRAM: Primary | ICD-10-CM

## 2024-06-06 NOTE — RESULT ENCOUNTER NOTE
Please let the patient know that results showed some findings that we need to follow-up on mammogram and ultrasound need to be done orders have been placed in the chart

## 2024-06-11 ENCOUNTER — TELEPHONE (OUTPATIENT)
Dept: INTERNAL MEDICINE CLINIC | Age: 54
End: 2024-06-11

## 2024-07-01 ENCOUNTER — TELEPHONE (OUTPATIENT)
Dept: WOMENS IMAGING | Age: 54
End: 2024-07-01

## 2024-07-01 NOTE — TELEPHONE ENCOUNTER
Left message for patient on VM requesting return call. Reaching out to reschedule missed breast imaging appointment.

## 2024-09-24 ENCOUNTER — TELEPHONE (OUTPATIENT)
Dept: INTERNAL MEDICINE CLINIC | Age: 54
End: 2024-09-24

## 2024-09-24 NOTE — TELEPHONE ENCOUNTER
Pt calling saw you for her yearly physical in May and is asking I you could order her nicotine patches to stop smokin---her  saw you earlier today and you gave him some ---she thought now might be good time for them both to stop smoking---please send to [facility]---Thanks.

## 2024-09-30 ENCOUNTER — OFFICE VISIT (OUTPATIENT)
Dept: INTERNAL MEDICINE CLINIC | Age: 54
End: 2024-09-30
Payer: COMMERCIAL

## 2024-09-30 VITALS
WEIGHT: 155.6 LBS | DIASTOLIC BLOOD PRESSURE: 84 MMHG | HEIGHT: 65 IN | OXYGEN SATURATION: 99 % | SYSTOLIC BLOOD PRESSURE: 132 MMHG | HEART RATE: 67 BPM | BODY MASS INDEX: 25.92 KG/M2

## 2024-09-30 DIAGNOSIS — R73.03 PREDIABETES: ICD-10-CM

## 2024-09-30 DIAGNOSIS — R13.12 OROPHARYNGEAL DYSPHAGIA: Primary | ICD-10-CM

## 2024-09-30 DIAGNOSIS — R03.0 ELEVATED BLOOD PRESSURE READING IN OFFICE WITHOUT DIAGNOSIS OF HYPERTENSION: ICD-10-CM

## 2024-09-30 DIAGNOSIS — F17.200 SMOKER: ICD-10-CM

## 2024-09-30 DIAGNOSIS — Z23 NEED FOR PROPHYLACTIC VACCINATION AGAINST STREPTOCOCCUS PNEUMONIAE (PNEUMOCOCCUS): ICD-10-CM

## 2024-09-30 PROCEDURE — 90677 PCV20 VACCINE IM: CPT | Performed by: INTERNAL MEDICINE

## 2024-09-30 PROCEDURE — 90471 IMMUNIZATION ADMIN: CPT | Performed by: INTERNAL MEDICINE

## 2024-09-30 PROCEDURE — 99214 OFFICE O/P EST MOD 30 MIN: CPT | Performed by: INTERNAL MEDICINE

## 2024-09-30 RX ORDER — NICOTINE 21 MG/24HR
1 PATCH, TRANSDERMAL 24 HOURS TRANSDERMAL EVERY 24 HOURS
Qty: 30 PATCH | Refills: 3 | Status: SHIPPED | OUTPATIENT
Start: 2024-09-30 | End: 2024-10-03 | Stop reason: SDUPTHER

## 2024-09-30 ASSESSMENT — ENCOUNTER SYMPTOMS
COLOR CHANGE: 0
CONSTIPATION: 0
ABDOMINAL PAIN: 0
WHEEZING: 0
BLOOD IN STOOL: 0
COUGH: 0
SINUS PAIN: 0
CHEST TIGHTNESS: 0
SHORTNESS OF BREATH: 0

## 2024-09-30 NOTE — PROGRESS NOTES
to person, place, and time.      Cranial Nerves: No cranial nerve deficit.      Motor: No weakness.      Gait: Gait normal.            This dictation was generated by voice recognition computer software.  Although all attempts are made to edit the dictation for accuracy, there may be errors in the transcription that are not intended.    An electronic signature was used to authenticate this note.    --Paty Sellers MD

## 2024-10-03 ENCOUNTER — TELEPHONE (OUTPATIENT)
Dept: INTERNAL MEDICINE CLINIC | Age: 54
End: 2024-10-03

## 2024-10-03 DIAGNOSIS — F17.200 SMOKER: ICD-10-CM

## 2024-10-03 RX ORDER — NICOTINE 21 MG/24HR
1 PATCH, TRANSDERMAL 24 HOURS TRANSDERMAL EVERY 24 HOURS
Qty: 30 PATCH | Refills: 3 | Status: SHIPPED | OUTPATIENT
Start: 2024-10-03 | End: 2025-10-03

## 2024-10-03 NOTE — TELEPHONE ENCOUNTER
Pt calling, [facility] still does not have nicotine (NICODERM CQ) 21 MG/24HR. Asks if it can be sent to [facility] on [address]. Pt is there now, asks if it can be sent over as soon as possible. Please advise.

## 2024-10-22 ENCOUNTER — TELEPHONE (OUTPATIENT)
Dept: INTERNAL MEDICINE CLINIC | Age: 54
End: 2024-10-22

## 2024-10-22 RX ORDER — NICOTINE 21 MG/24HR
1 PATCH, TRANSDERMAL 24 HOURS TRANSDERMAL DAILY
Qty: 30 PATCH | Refills: 2 | Status: SHIPPED | OUTPATIENT
Start: 2024-10-22 | End: 2025-04-20

## 2024-10-22 NOTE — TELEPHONE ENCOUNTER
Medication: nicoderm 14mg     Last Filled:  10/3/24    Patient Pharmacy: [facility]     Patient Phone Number: [phone] (home)     Last appt: 9/30/2024   Next appt: Visit date not found    Last OARRS:        No data to display                Last Labs DM:   Lab Results   Component Value Date/Time    LABA1C 5.8 05/17/2024 03:19 PM     Last Lipid:   Lab Results   Component Value Date/Time    CHOL 184 05/17/2024 03:19 PM    TRIG 163 05/17/2024 03:19 PM    HDL 47 05/17/2024 03:19 PM     Last PSA: No results found for: \"PSA\"  Last Thyroid:   Lab Results   Component Value Date/Time    TSH 1.96 05/17/2024 03:19 PM

## 2024-10-22 NOTE — TELEPHONE ENCOUNTER
Pt calling wants nicoderm 14mg sent to [facility] pharmacy. Pt states she is currently taking 21mg and is not sure how long she needs to stay on it before decreasing dose to 14mg please advise

## 2024-10-25 ENCOUNTER — TELEPHONE (OUTPATIENT)
Dept: INTERNAL MEDICINE CLINIC | Age: 54
End: 2024-10-25

## 2024-10-25 NOTE — TELEPHONE ENCOUNTER
Patient calling, states that the pharmacy does not have the 14MG. Patient states she is completely out of medication and needs the 14 MG. Please advise     Medication: nicotine (NICODERM CQ) 14 MG/24HR     Last Filled:      Patient Pharmacy: [facility] [id number] - [facility] [address] - P [phone] - F [phone] [address] [facility] [address]  Phone: [phone]  Fax: [phone]     Patient Phone Number: [phone] (home)     Last appt: 9/30/2024   Next appt: Visit date not found    Last OARRS:        No data to display                Last Labs DM:   Lab Results   Component Value Date/Time    LABA1C 5.8 05/17/2024 03:19 PM     Last Lipid:   Lab Results   Component Value Date/Time    CHOL 184 05/17/2024 03:19 PM    TRIG 163 05/17/2024 03:19 PM    HDL 47 05/17/2024 03:19 PM     Last PSA: No results found for: \"PSA\"  Last Thyroid:   Lab Results   Component Value Date/Time    TSH 1.96 05/17/2024 03:19 PM

## 2024-10-25 NOTE — TELEPHONE ENCOUNTER
Patient called back states pharmacy had medication under her old last name. So pharmacy does have the 14 MG so it does not need to be re-sent.

## 2025-01-13 NOTE — TELEPHONE ENCOUNTER
Patient requesting if she could lower nicoderm to 7mg. Patient states pharmacy keeps filling it for 14 mg. Please advise. Pt wants them sent to [facility] [address]